# Patient Record
Sex: FEMALE | Race: WHITE | URBAN - METROPOLITAN AREA
[De-identification: names, ages, dates, MRNs, and addresses within clinical notes are randomized per-mention and may not be internally consistent; named-entity substitution may affect disease eponyms.]

---

## 2018-04-03 ENCOUNTER — APPOINTMENT (OUTPATIENT)
Age: 28
Setting detail: DERMATOLOGY
End: 2018-04-05

## 2018-04-03 DIAGNOSIS — L90.5 SCAR CONDITIONS AND FIBROSIS OF SKIN: ICD-10-CM

## 2018-04-03 DIAGNOSIS — L70.8 OTHER ACNE: ICD-10-CM

## 2018-04-03 PROCEDURE — OTHER MIPS QUALITY: OTHER

## 2018-04-03 PROCEDURE — OTHER COUNSELING: OTHER

## 2018-04-03 PROCEDURE — 99203 OFFICE O/P NEW LOW 30 MIN: CPT

## 2018-04-03 PROCEDURE — OTHER TREATMENT REGIMEN: OTHER

## 2018-04-03 PROCEDURE — OTHER PRESCRIPTION: OTHER

## 2018-04-03 PROCEDURE — OTHER IN-HOUSE DISPENSING PHARMACY: OTHER

## 2018-04-03 RX ORDER — ADAPALENE AND BENZOYL PEROXIDE 3; 25 MG/G; MG/G
GEL TOPICAL
Qty: 1 | Refills: 2 | Status: ERX | COMMUNITY
Start: 2018-04-03

## 2018-04-03 ASSESSMENT — LOCATION ZONE DERM: LOCATION ZONE: FACE

## 2018-04-03 ASSESSMENT — LOCATION SIMPLE DESCRIPTION DERM
LOCATION SIMPLE: RIGHT CHEEK
LOCATION SIMPLE: RIGHT FOREHEAD
LOCATION SIMPLE: LEFT CHEEK

## 2018-04-03 ASSESSMENT — LOCATION DETAILED DESCRIPTION DERM
LOCATION DETAILED: RIGHT LATERAL BUCCAL CHEEK
LOCATION DETAILED: LEFT LATERAL BUCCAL CHEEK
LOCATION DETAILED: RIGHT MEDIAL FOREHEAD
LOCATION DETAILED: RIGHT SUPERIOR MEDIAL BUCCAL CHEEK

## 2018-04-03 NOTE — PROCEDURE: IN-HOUSE DISPENSING PHARMACY
Product 5 Application Directions: Apply 1-2 pumps at bedtime \\nLot#[id number] Product 5 Application Directions: Apply 1-2 pumps at bedtime \\nLot#[id number]

## 2018-04-03 NOTE — HPI: PIMPLES (ACNE)
How Severe Is Your Acne?: mild
Is This A New Presentation, Or A Follow-Up?: Acne
Additional Comments (Use Complete Sentences): Pt states she just moved from [place] 2 months ago

## 2018-04-03 NOTE — PROCEDURE: IN-HOUSE DISPENSING PHARMACY
Product 3 Application Directions: Apply QHS\\nLot#[id number] Product 3 Application Directions: Apply QHS\\nLot#[id number]

## 2018-04-03 NOTE — PROCEDURE: IN-HOUSE DISPENSING PHARMACY
Product 6 Application Directions: Apply to affected areas two a day, two weeks on one week off. \\n\\nLot# [id number] Product 6 Application Directions: Apply to affected areas two a day, two weeks on one week off. \\n\\nLot# [id number]

## 2018-04-03 NOTE — PROCEDURE: TREATMENT REGIMEN
Initiate Treatment: Spironolactone 5% gel apply to face in the AM
Otc Regimen: Probiotics daily in the AM on a empty stomach
Detail Level: Zone
Samples Given: La Roche posay sunscreen 60 x1, la Roche posay foaming cleanser x1
Plan: I recommended to do 12 weeks of therapy if acne persists I recommended doing blue light treatments and red light. I advised to dilute the moisturizer with the Epiduo Forte if irritant. I discussed with Pt that Accutane will not treat hormonal acne so I don’t recommend it

## 2018-04-03 NOTE — PROCEDURE: IN-HOUSE DISPENSING PHARMACY
Product 11 Application Directions: Apply to affected areas twice once daily\\nLot# [id number] Product 11 Application Directions: Apply to affected areas twice once daily\\nLot# [id number]

## 2018-04-03 NOTE — PROCEDURE: IN-HOUSE DISPENSING PHARMACY
Product 1 Application Directions: Apply a pea-size amount to face once a day in the night\\nLot Number: [id number] Product 1 Application Directions: Apply a pea-size amount to face once a day in the night\\nLot Number: [id number]

## 2024-10-18 NOTE — PROCEDURE: IN-HOUSE DISPENSING PHARMACY
Patient scheduled for surgery  1/20/25  at [facility] OR. Instructions and PAT's gone over with and handed to the patient.      Abx   Bloodwork - per Dr. Greene      Name Of Product 7: Ketaconazole 2% Cream